# Patient Record
Sex: FEMALE | Race: BLACK OR AFRICAN AMERICAN | Employment: OTHER | URBAN - METROPOLITAN AREA
[De-identification: names, ages, dates, MRNs, and addresses within clinical notes are randomized per-mention and may not be internally consistent; named-entity substitution may affect disease eponyms.]

---

## 2023-05-01 ENCOUNTER — APPOINTMENT (OUTPATIENT)
Dept: GENERAL RADIOLOGY | Age: 73
End: 2023-05-01
Attending: EMERGENCY MEDICINE
Payer: MEDICARE

## 2023-05-01 ENCOUNTER — HOSPITAL ENCOUNTER (EMERGENCY)
Age: 73
Discharge: HOME | End: 2023-05-01
Attending: EMERGENCY MEDICINE
Payer: MEDICARE

## 2023-05-01 VITALS
WEIGHT: 180 LBS | SYSTOLIC BLOOD PRESSURE: 154 MMHG | OXYGEN SATURATION: 97 % | HEIGHT: 68 IN | RESPIRATION RATE: 16 BRPM | TEMPERATURE: 98.1 F | BODY MASS INDEX: 27.28 KG/M2 | DIASTOLIC BLOOD PRESSURE: 70 MMHG | HEART RATE: 75 BPM

## 2023-05-01 DIAGNOSIS — R07.89 CHEST WALL PAIN: Primary | ICD-10-CM

## 2023-05-01 PROCEDURE — 71046 X-RAY EXAM CHEST 2 VIEWS: CPT

## 2023-05-01 PROCEDURE — 74011250637 HC RX REV CODE- 250/637: Performed by: EMERGENCY MEDICINE

## 2023-05-01 PROCEDURE — 99284 EMERGENCY DEPT VISIT MOD MDM: CPT

## 2023-05-01 PROCEDURE — 93005 ELECTROCARDIOGRAM TRACING: CPT

## 2023-05-01 RX ORDER — CYCLOBENZAPRINE HCL 10 MG
10 TABLET ORAL
Qty: 15 TABLET | Refills: 0 | Status: SHIPPED | OUTPATIENT
Start: 2023-05-01 | End: 2023-05-06

## 2023-05-01 RX ORDER — CYCLOBENZAPRINE HCL 10 MG
10 TABLET ORAL
Status: COMPLETED | OUTPATIENT
Start: 2023-05-01 | End: 2023-05-01

## 2023-05-01 RX ADMIN — CYCLOBENZAPRINE 10 MG: 10 TABLET, FILM COATED ORAL at 16:46

## 2023-05-01 NOTE — ED TRIAGE NOTES
Pt to ER with intermittent prasanna rib pain since this weekend. Pt took advil no relief. Pt reports hx of dm and htn.

## 2023-05-01 NOTE — DISCHARGE INSTRUCTIONS
Please take the muscle relaxers as directed. You should not be driving with them. Return to the ER with any new or worsening symptoms, especially lightheadedness, getting very sweaty, being short of breath, very nauseous, or or if you develop any other symptoms you find concerning.   Please follow-up with your primary care doctor

## 2023-05-01 NOTE — ED PROVIDER NOTES
HPI   74-year-old female presenting to the emergency department due to chest wall pain. For the last several months patient has been having intermittent pain on the lateral aspects of her chest wall underneath her axilla goes to the posterior aspect of her chest wall. The pain is made worse with positional changes such as when laying to sitting. It also hurts to move and she denies any other exacerbating or alleviating factors. She has had bilateral pain for several days and today it went away on the left side and started to be only on the right side of her chest wall underneath her axilla. Not short of breath. No trauma. No diaphoresis. No exertional symptoms. She took some Advil without relief. She gets her medical care at [facility] and after seeing her doctor there she was told to go to the emergency department. No past medical history on file. No past surgical history on file. No family history on file.     Social History     Socioeconomic History    Marital status: OTHER     Spouse name: Not on file    Number of children: Not on file    Years of education: Not on file    Highest education level: Not on file   Occupational History    Not on file   Tobacco Use    Smoking status: Not on file    Smokeless tobacco: Not on file   Substance and Sexual Activity    Alcohol use: Not on file    Drug use: Not on file    Sexual activity: Not on file   Other Topics Concern    Not on file   Social History Narrative    Not on file     Social Determinants of Health     Financial Resource Strain: Not on file   Food Insecurity: Not on file   Transportation Needs: Not on file   Physical Activity: Not on file   Stress: Not on file   Social Connections: Not on file   Intimate Partner Violence: Not on file   Housing Stability: Not on file         ALLERGIES: Aspirin and Keflex [cephalexin]    Review of Systems  A complete review of systems was performed and all systems reviewed are negative less otherwise documented in the HPI  Vitals:    05/01/23 1610   BP: (!) 154/70   Pulse: 75   Resp: 16   Temp: 98.1 °F (36.7 °C)   SpO2: 97%   Weight: 81.6 kg (180 lb)   Height: 5' 8\" (1.727 m)            Physical Exam  Constitutional:       Comments: Not acutely distressed or ill-appearing   HENT:      Mouth/Throat:      Comments: Moist mucous membranes  Eyes:      General: No scleral icterus. Extraocular Movements: Extraocular movements intact. Neck:      Comments: Trachea midline  Cardiovascular:      Rate and Rhythm: Normal rate and regular rhythm. Heart sounds: No murmur heard. Comments: 2+ radial pulses bilaterally  Pulmonary:      Comments: Breath sounds are present and clear bilaterally. Reproducible tenderness on the right chest wall underneath the axilla without any bruising or crepitus. Abdominal:      General: There is no distension. Palpations: Abdomen is soft. Tenderness: There is no abdominal tenderness. Comments: No CVA tenderness bilaterally   Musculoskeletal:         General: No deformity. Normal range of motion. Cervical back: Normal range of motion. Skin:     General: Skin is warm and dry. Comments: No rash on the chest wall   Neurological:      Comments: Awake and alert. GCS 15        Medical Decision Making  Amount and/or Complexity of Data Reviewed  Radiology: ordered. ECG/medicine tests: ordered. Risk  Prescription drug management. ED Course as of 05/01/23 1803   Mon May 01, 2023   1801 EKG shows normal sinus rhythm with rate 65. QTc 388. No concerning ST elevations or depressions. No arrhythmias. [MM]      ED Course User Index  [MM] Hannah Lala MD   74-year-old female presenting with the above chief complaint. Vitals are stable. She has focal reproducible tenderness underneath the right axilla with no skin changes, rash, crepitus, or anything else on exam.  She has breath sounds bilaterally. She has an EKG without any findings of obvious ischemia.   Her symptoms seem to be very chronic and recurrent in nature. I have a low suspicion for ACS or PE. Chest x-ray shows no acute processes. Symptoms essentially resolved after Flexeril. Think she is appropriate for discharge and to follow-up with her doctor as an outpatient. She was prescribed some Flexeril.   Patient and her  understand reasons to return and patient discharged in stable condition    Procedures

## 2023-05-03 LAB
ATRIAL RATE: 65 BPM
CALCULATED P AXIS, ECG09: 42 DEGREES
CALCULATED R AXIS, ECG10: -24 DEGREES
CALCULATED T AXIS, ECG11: 31 DEGREES
DIAGNOSIS, 93000: NORMAL
P-R INTERVAL, ECG05: 208 MS
Q-T INTERVAL, ECG07: 374 MS
QRS DURATION, ECG06: 82 MS
QTC CALCULATION (BEZET), ECG08: 388 MS
VENTRICULAR RATE, ECG03: 65 BPM